# Patient Record
Sex: MALE | URBAN - METROPOLITAN AREA
[De-identification: names, ages, dates, MRNs, and addresses within clinical notes are randomized per-mention and may not be internally consistent; named-entity substitution may affect disease eponyms.]

---

## 2020-05-11 ENCOUNTER — APPOINTMENT (OUTPATIENT)
Age: 40
Setting detail: DERMATOLOGY
End: 2020-05-11

## 2020-05-11 DIAGNOSIS — K13.0 DISEASES OF LIPS: ICD-10-CM

## 2020-05-11 DIAGNOSIS — B35.3 TINEA PEDIS: ICD-10-CM

## 2020-05-11 PROCEDURE — 99202 OFFICE O/P NEW SF 15 MIN: CPT

## 2020-05-11 PROCEDURE — OTHER PRESCRIPTION: OTHER

## 2020-05-11 PROCEDURE — OTHER ADDITIONAL NOTES: OTHER

## 2020-05-11 PROCEDURE — OTHER COUNSELING: OTHER

## 2020-05-11 PROCEDURE — OTHER PRESCRIPTION SAMPLES PROVIDED: OTHER

## 2020-05-11 RX ORDER — KETOCONAZOLE 20 MG/G
CREAM TOPICAL BID
Qty: 1 | Refills: 2 | Status: ERX | COMMUNITY
Start: 2020-05-11

## 2020-05-11 RX ORDER — NYSTATIN 100000 [USP'U]/G
OINTMENT TOPICAL BID-TID
Qty: 1 | Refills: 1 | Status: ERX | COMMUNITY
Start: 2020-05-11

## 2020-05-11 RX ORDER — HYDROCORTISONE 25 MG/G
OINTMENT TOPICAL BID
Qty: 1 | Refills: 1 | Status: ERX | COMMUNITY
Start: 2020-05-11

## 2020-05-11 ASSESSMENT — LOCATION SIMPLE DESCRIPTION DERM
LOCATION SIMPLE: RIGHT PLANTAR SURFACE
LOCATION SIMPLE: RIGHT LIP
LOCATION SIMPLE: LEFT PLANTAR SURFACE
LOCATION SIMPLE: LEFT LIP

## 2020-05-11 ASSESSMENT — LOCATION DETAILED DESCRIPTION DERM
LOCATION DETAILED: LEFT INFERIOR VERMILION LIP
LOCATION DETAILED: RIGHT SUPERIOR VERMILION LIP
LOCATION DETAILED: LEFT MEDIAL PLANTAR MIDFOOT
LOCATION DETAILED: RIGHT MEDIAL PLANTAR MIDFOOT

## 2020-05-11 ASSESSMENT — LOCATION ZONE DERM
LOCATION ZONE: FEET
LOCATION ZONE: LIP

## 2020-05-11 NOTE — PROCEDURE: ADDITIONAL NOTES
Additional Notes: Explained that this is not related to seasonal allergies unless he is allergic to something he is using topically. Recommend that patient discontinues using his Carmex Vanilla at this time as the fragrance could be aggravating the condition. Recommend Aquaphor ointment in between uses of the prescription products.
Detail Level: Detailed

## 2020-05-11 NOTE — HPI: RASH
How Severe Is Your Rash?: moderate
Is This A New Presentation, Or A Follow-Up?: Rash
Additional History: Using carmex. Denies gastric bypass or ibd or any abdominal problems. He reports not licking lip.